# Patient Record
Sex: MALE | Race: OTHER | NOT HISPANIC OR LATINO | URBAN - METROPOLITAN AREA
[De-identification: names, ages, dates, MRNs, and addresses within clinical notes are randomized per-mention and may not be internally consistent; named-entity substitution may affect disease eponyms.]

---

## 2018-03-29 ENCOUNTER — EMERGENCY (EMERGENCY)
Facility: HOSPITAL | Age: 28
LOS: 1 days | Discharge: HOME | End: 2018-03-29
Attending: EMERGENCY MEDICINE | Admitting: EMERGENCY MEDICINE
Payer: MEDICAID

## 2018-03-29 VITALS
SYSTOLIC BLOOD PRESSURE: 131 MMHG | WEIGHT: 225.09 LBS | RESPIRATION RATE: 18 BRPM | HEART RATE: 94 BPM | DIASTOLIC BLOOD PRESSURE: 83 MMHG | OXYGEN SATURATION: 98 % | TEMPERATURE: 98 F

## 2018-03-29 LAB
ALBUMIN SERPL ELPH-MCNC: 4.6 G/DL — SIGNIFICANT CHANGE UP (ref 3.3–5)
ALP SERPL-CCNC: 67 U/L — SIGNIFICANT CHANGE UP (ref 40–120)
ALT FLD-CCNC: 25 U/L RC — SIGNIFICANT CHANGE UP (ref 10–45)
ANION GAP SERPL CALC-SCNC: 14 MMOL/L — SIGNIFICANT CHANGE UP (ref 5–17)
APPEARANCE UR: ABNORMAL
AST SERPL-CCNC: 22 U/L — SIGNIFICANT CHANGE UP (ref 10–40)
BASOPHILS # BLD AUTO: 0 K/UL — SIGNIFICANT CHANGE UP (ref 0–0.2)
BASOPHILS NFR BLD AUTO: 0.4 % — SIGNIFICANT CHANGE UP (ref 0–2)
BILIRUB SERPL-MCNC: 0.3 MG/DL — SIGNIFICANT CHANGE UP (ref 0.2–1.2)
BILIRUB UR-MCNC: NEGATIVE — SIGNIFICANT CHANGE UP
BUN SERPL-MCNC: 14 MG/DL — SIGNIFICANT CHANGE UP (ref 7–23)
CALCIUM SERPL-MCNC: 9.5 MG/DL — SIGNIFICANT CHANGE UP (ref 8.4–10.5)
CHLORIDE SERPL-SCNC: 104 MMOL/L — SIGNIFICANT CHANGE UP (ref 96–108)
CO2 SERPL-SCNC: 24 MMOL/L — SIGNIFICANT CHANGE UP (ref 22–31)
COLOR SPEC: YELLOW — SIGNIFICANT CHANGE UP
CREAT SERPL-MCNC: 0.93 MG/DL — SIGNIFICANT CHANGE UP (ref 0.5–1.3)
DIFF PNL FLD: NEGATIVE — SIGNIFICANT CHANGE UP
EOSINOPHIL # BLD AUTO: 0.2 K/UL — SIGNIFICANT CHANGE UP (ref 0–0.5)
EOSINOPHIL NFR BLD AUTO: 1.6 % — SIGNIFICANT CHANGE UP (ref 0–6)
GAS PNL BLDV: SIGNIFICANT CHANGE UP
GLUCOSE SERPL-MCNC: 99 MG/DL — SIGNIFICANT CHANGE UP (ref 70–99)
GLUCOSE UR QL: NEGATIVE — SIGNIFICANT CHANGE UP
HCT VFR BLD CALC: 47.5 % — SIGNIFICANT CHANGE UP (ref 39–50)
HGB BLD-MCNC: 17.3 G/DL — HIGH (ref 13–17)
KETONES UR-MCNC: NEGATIVE — SIGNIFICANT CHANGE UP
LEUKOCYTE ESTERASE UR-ACNC: NEGATIVE — SIGNIFICANT CHANGE UP
LYMPHOCYTES # BLD AUTO: 3.8 K/UL — HIGH (ref 1–3.3)
LYMPHOCYTES # BLD AUTO: 31.1 % — SIGNIFICANT CHANGE UP (ref 13–44)
MAGNESIUM SERPL-MCNC: 2.2 MG/DL — SIGNIFICANT CHANGE UP (ref 1.6–2.6)
MCHC RBC-ENTMCNC: 33.5 PG — SIGNIFICANT CHANGE UP (ref 27–34)
MCHC RBC-ENTMCNC: 36.3 GM/DL — HIGH (ref 32–36)
MCV RBC AUTO: 92.3 FL — SIGNIFICANT CHANGE UP (ref 80–100)
MONOCYTES # BLD AUTO: 0.8 K/UL — SIGNIFICANT CHANGE UP (ref 0–0.9)
MONOCYTES NFR BLD AUTO: 6.3 % — SIGNIFICANT CHANGE UP (ref 2–14)
NEUTROPHILS # BLD AUTO: 7.4 K/UL — SIGNIFICANT CHANGE UP (ref 1.8–7.4)
NEUTROPHILS NFR BLD AUTO: 60.6 % — SIGNIFICANT CHANGE UP (ref 43–77)
NITRITE UR-MCNC: NEGATIVE — SIGNIFICANT CHANGE UP
PH UR: 7.5 — SIGNIFICANT CHANGE UP (ref 5–8)
PHOSPHATE SERPL-MCNC: 3.7 MG/DL — SIGNIFICANT CHANGE UP (ref 2.5–4.5)
PLATELET # BLD AUTO: 210 K/UL — SIGNIFICANT CHANGE UP (ref 150–400)
POTASSIUM SERPL-MCNC: 4 MMOL/L — SIGNIFICANT CHANGE UP (ref 3.5–5.3)
POTASSIUM SERPL-SCNC: 4 MMOL/L — SIGNIFICANT CHANGE UP (ref 3.5–5.3)
PROT SERPL-MCNC: 8.2 G/DL — SIGNIFICANT CHANGE UP (ref 6–8.3)
PROT UR-MCNC: SIGNIFICANT CHANGE UP
RBC # BLD: 5.15 M/UL — SIGNIFICANT CHANGE UP (ref 4.2–5.8)
RBC # FLD: 11.8 % — SIGNIFICANT CHANGE UP (ref 10.3–14.5)
SODIUM SERPL-SCNC: 142 MMOL/L — SIGNIFICANT CHANGE UP (ref 135–145)
SP GR SPEC: 1.03 — HIGH (ref 1.01–1.02)
UROBILINOGEN FLD QL: 4 MG/DL
WBC # BLD: 12.3 K/UL — HIGH (ref 3.8–10.5)
WBC # FLD AUTO: 12.3 K/UL — HIGH (ref 3.8–10.5)

## 2018-03-29 PROCEDURE — 99284 EMERGENCY DEPT VISIT MOD MDM: CPT

## 2018-03-29 RX ORDER — ACETAMINOPHEN 500 MG
1000 TABLET ORAL ONCE
Qty: 0 | Refills: 0 | Status: COMPLETED | OUTPATIENT
Start: 2018-03-29 | End: 2018-03-29

## 2018-03-29 RX ORDER — SODIUM CHLORIDE 9 MG/ML
1000 INJECTION, SOLUTION INTRAVENOUS ONCE
Qty: 0 | Refills: 0 | Status: COMPLETED | OUTPATIENT
Start: 2018-03-29 | End: 2018-03-29

## 2018-03-29 RX ORDER — LIDOCAINE 4 G/100G
10 CREAM TOPICAL ONCE
Qty: 0 | Refills: 0 | Status: COMPLETED | OUTPATIENT
Start: 2018-03-29 | End: 2018-03-29

## 2018-03-29 RX ADMIN — Medication 400 MILLIGRAM(S): at 23:26

## 2018-03-29 RX ADMIN — SODIUM CHLORIDE 1000 MILLILITER(S): 9 INJECTION, SOLUTION INTRAVENOUS at 23:25

## 2018-03-29 RX ADMIN — LIDOCAINE 10 MILLILITER(S): 4 CREAM TOPICAL at 23:26

## 2018-03-29 NOTE — ED PROVIDER NOTE - CARE PLAN
Principal Discharge DX:	Strep throat  Assessment and plan of treatment:	- You received an antibiotic injection in the ER.  You do not require any further antibiotics.    - Please follow up with your PMD early next week.

## 2018-03-29 NOTE — ED ADULT NURSE NOTE - OBJECTIVE STATEMENT
right flank /rlq pain x 1 week; thoat pain, intermittent weakness; tmax 102 yesterday at home; went to urgent care ; sent in for eval

## 2018-03-29 NOTE — ED PROVIDER NOTE - PLAN OF CARE
- You received an antibiotic injection in the ER.  You do not require any further antibiotics.    - Please follow up with your PMD early next week.

## 2018-03-29 NOTE — ED PROVIDER NOTE - MEDICAL DECISION MAKING DETAILS
27M with RLQ/flank pain, urinary frequency, malodorous urine, throat pain, weakness, dizziness/lightheadedness, anorexia, and fevers concerning for pyelonephritis vs appendicitis vs cholecystitis.  Will obtain CBC, CMP, Mg, Phos, RVP, UA, UCx, BCx, CT A/P with PO/IV contrast.  Will start with giving Ofirmev for pain/low grade fever, viscous lidocaine for throat pain, and LR 1L. 27M with RLQ/flank pain, urinary frequency, malodorous urine, throat pain, weakness, dizziness/lightheadedness, anorexia, and fevers concerning for pyelonephritis vs appendicitis vs cholecystitis.  Will obtain CBC, CMP, Mg, Phos, VBG, RVP, UA, UCx, BCx, CT A/P with PO/IV contrast.  Will start with giving Ofirmev for pain/low grade fever, viscous lidocaine for throat pain, and LR 1L. 27M with RLQ/flank pain, urinary frequency, malodorous urine, throat pain, weakness, dizziness/lightheadedness, anorexia, and fevers concerning for pyelonephritis vs appendicitis vs cholecystitis.  Will obtain CBC, CMP, Mg, Phos, VBG, RVP, UA, UCx, BCx, CT A/P with PO/IV contrast.  Will start with giving Ofirmev for pain/low grade fever, viscous lidocaine for throat pain, and LR 1L.    YUE Rabago MD: Pt is a 28 y/o male with no significant PMH who presents with abd pain x7d.  Patient reports RLQ/flank pain that waxes and wanes. Patient had also developed throat pain.  In the last 2-3d, he has had fevers with Tmax 102 yesterday, weakness, and intermittent dizziness.  For his symptoms, he has been taking PRN Tylenol, Motrin, and Sudafed.  Endorses dry cough, anorexia, urinary frequency, malodorous urine.  Denies runny/stuffy nose, CP, myalgias, N/V/C/D, dysuria.  Patient with positive sick contact history in 1 year old son who goes to  who 2d ago had a mild URI.  DDx: URI, appy, mesenteric adenitis, strep throat  Plan: basic labs, CTA/P, strep throat cx

## 2018-03-29 NOTE — ED PROVIDER NOTE - PROGRESS NOTE DETAILS
Pt is feeling much better, aware that he has strep throat and will get penicillin here in ED.  Aware he needs pmd follow up.  - Nancy Cisneros, DO

## 2018-03-29 NOTE — ED PROVIDER NOTE - PHYSICAL EXAMINATION
GENERAL: NAD, ill-appearing, obese, well-developed  HEAD: Atraumatic, Normocephalic  EYES: EOMI, PERRLA, conjunctiva and sclera clear  MOUTH: MMM, no pharyngitis  NECK: Supple, No JVD  CHEST/LUNG: Clear to auscultation bilaterally; No wheeze  HEART: Regular rate and rhythm; No murmurs, rubs, or gallops  ABDOMEN: Soft, R mid/flank-TTP, Nondistended; Bowel sounds present  EXTREMITIES:  2+ Peripheral Pulses, No clubbing, cyanosis, or edema  PSYCH: AAOx3  SKIN: No rashes or lesions

## 2018-03-29 NOTE — ED PROVIDER NOTE - NS ED ROS FT
CONSTITUTIONAL: +weakness, fevers, No chills  EYES/ENT: +throat pain, No visual changes;  No vertigo   NECK: No pain or stiffness  RESPIRATORY: +dry cough, No wheezing, hemoptysis; No shortness of breath  CARDIOVASCULAR: No chest pain or palpitations  GASTROINTESTINAL: +RLQ/R flank pain, No nausea, vomiting, or hematemesis; No diarrhea or constipation. No melena or hematochezia.  GENITOURINARY: +urinary frequency, malodorous urine, No dysuria or hematuria  NEUROLOGICAL: No numbness or weakness  SKIN: No itching, burning, rashes, or lesions   All other review of systems is negative unless indicated above.

## 2018-03-29 NOTE — ED PROVIDER NOTE - FAMILY HISTORY
No pertinent family history in first degree relatives Mother  Still living? Unknown  Family history of hypertension, Age at diagnosis: Age Unknown  Family history of diabetes mellitus, Age at diagnosis: Age Unknown     Father  Still living? Unknown  Family history of hypertension, Age at diagnosis: Age Unknown  Family history of coronary artery disease, Age at diagnosis: Age Unknown  Family history of cerebrovascular accident (CVA) in father, Age at diagnosis: Age Unknown     Sibling  Still living? Unknown  Family history of hypertension, Age at diagnosis: Age Unknown  Family history of uterine cancer, Age at diagnosis: Age Unknown  Family history of breast cancer in sister, Age at diagnosis: Age Unknown

## 2018-03-29 NOTE — ED PROVIDER NOTE - OBJECTIVE STATEMENT
27M with no significant PMH, current smoker who presents 27M with no significant PMH, current smoker who presents with abd pain x7d.  Patient reports RLQ/flank pain that waxes and wanes.  At worst, he states it is a 9/10 and at best, 6/10.  Pain exacerbated at by deep breaths.  At onset, patient had also developed throat pain.  In the last 2-3d, he has had fevers with Tmax 102 yesterday, weakness, and intermittent dizziness.  For his symptoms, he has been taking PRN Tylenol, Motrin, and Sudafed.  Patient with positive sick contact history in 1 year old son who goes to  who 2d ago had a mild URI.    Today, patient attempted to go see his PMD, Dr Navas, but his office was closed.  As such, his fiance took him to Urgent Care, who recommended patient come for further evaluation to r/o appendicitis, etc.    PMD Dr Mark Navas 27M with no significant PMH, current smoker who presents with abd pain x7d.  Patient reports RLQ/flank pain that waxes and wanes.  At worst, he states it is a 9/10 and at best, 6/10.  Pain exacerbated at by deep breaths.  At onset, patient had also developed throat pain.  In the last 2-3d, he has had fevers with Tmax 102 yesterday, weakness, and intermittent dizziness.  For his symptoms, he has been taking PRN Tylenol, Motrin, and Sudafed.  Endorses dry cough, anorexia, urinary frequency, malodorous urine.  Denies runny/stuffy nose, CP, myalgias, N/V/C/D, dysuria.  Patient with positive sick contact history in 1 year old son who goes to  who 2d ago had a mild URI.    Today, patient attempted to go see his PMD, Dr Navas, but his office was closed.  As such, his fiance took him to Urgent Care, who recommended patient come for further evaluation to r/o appendicitis, etc.    PMD Dr Mark Navas

## 2018-03-30 VITALS
SYSTOLIC BLOOD PRESSURE: 119 MMHG | OXYGEN SATURATION: 97 % | TEMPERATURE: 98 F | HEART RATE: 92 BPM | RESPIRATION RATE: 17 BRPM | DIASTOLIC BLOOD PRESSURE: 74 MMHG

## 2018-03-30 LAB
C TRACH RRNA SPEC QL NAA+PROBE: SIGNIFICANT CHANGE UP
CULTURE RESULTS: SIGNIFICANT CHANGE UP
N GONORRHOEA RRNA SPEC QL NAA+PROBE: SIGNIFICANT CHANGE UP
RAPID RVP RESULT: SIGNIFICANT CHANGE UP
S PYO AG SPEC QL IA: POSITIVE
SPECIMEN SOURCE: SIGNIFICANT CHANGE UP
SPECIMEN SOURCE: SIGNIFICANT CHANGE UP

## 2018-03-30 PROCEDURE — 85027 COMPLETE CBC AUTOMATED: CPT

## 2018-03-30 PROCEDURE — 80053 COMPREHEN METABOLIC PANEL: CPT

## 2018-03-30 PROCEDURE — 84132 ASSAY OF SERUM POTASSIUM: CPT

## 2018-03-30 PROCEDURE — 87086 URINE CULTURE/COLONY COUNT: CPT

## 2018-03-30 PROCEDURE — 87633 RESP VIRUS 12-25 TARGETS: CPT

## 2018-03-30 PROCEDURE — 87040 BLOOD CULTURE FOR BACTERIA: CPT

## 2018-03-30 PROCEDURE — 83605 ASSAY OF LACTIC ACID: CPT

## 2018-03-30 PROCEDURE — 87486 CHLMYD PNEUM DNA AMP PROBE: CPT

## 2018-03-30 PROCEDURE — 82803 BLOOD GASES ANY COMBINATION: CPT

## 2018-03-30 PROCEDURE — 96374 THER/PROPH/DIAG INJ IV PUSH: CPT | Mod: XU

## 2018-03-30 PROCEDURE — 82435 ASSAY OF BLOOD CHLORIDE: CPT

## 2018-03-30 PROCEDURE — 87798 DETECT AGENT NOS DNA AMP: CPT

## 2018-03-30 PROCEDURE — 99284 EMERGENCY DEPT VISIT MOD MDM: CPT | Mod: 25

## 2018-03-30 PROCEDURE — 82330 ASSAY OF CALCIUM: CPT

## 2018-03-30 PROCEDURE — 74177 CT ABD & PELVIS W/CONTRAST: CPT | Mod: 26

## 2018-03-30 PROCEDURE — 96372 THER/PROPH/DIAG INJ SC/IM: CPT | Mod: XU

## 2018-03-30 PROCEDURE — 84295 ASSAY OF SERUM SODIUM: CPT

## 2018-03-30 PROCEDURE — 84100 ASSAY OF PHOSPHORUS: CPT

## 2018-03-30 PROCEDURE — 87581 M.PNEUMON DNA AMP PROBE: CPT

## 2018-03-30 PROCEDURE — 81001 URINALYSIS AUTO W/SCOPE: CPT

## 2018-03-30 PROCEDURE — 85014 HEMATOCRIT: CPT

## 2018-03-30 PROCEDURE — 83735 ASSAY OF MAGNESIUM: CPT

## 2018-03-30 PROCEDURE — 87880 STREP A ASSAY W/OPTIC: CPT

## 2018-03-30 PROCEDURE — 74177 CT ABD & PELVIS W/CONTRAST: CPT

## 2018-03-30 PROCEDURE — 82947 ASSAY GLUCOSE BLOOD QUANT: CPT

## 2018-03-30 RX ORDER — AMOXICILLIN 250 MG/5ML
500 SUSPENSION, RECONSTITUTED, ORAL (ML) ORAL ONCE
Qty: 0 | Refills: 0 | Status: COMPLETED | OUTPATIENT
Start: 2018-03-30 | End: 2018-03-30

## 2018-03-30 RX ORDER — PENICILLIN G BENZATHINE 1200000 [IU]/2ML
1.2 INJECTION, SUSPENSION INTRAMUSCULAR ONCE
Qty: 0 | Refills: 0 | Status: COMPLETED | OUTPATIENT
Start: 2018-03-30 | End: 2018-03-30

## 2018-03-30 RX ORDER — DEXAMETHASONE 0.5 MG/5ML
6 ELIXIR ORAL ONCE
Qty: 0 | Refills: 0 | Status: COMPLETED | OUTPATIENT
Start: 2018-03-30 | End: 2018-03-30

## 2018-03-30 RX ADMIN — Medication 1000 MILLIGRAM(S): at 01:38

## 2018-03-30 RX ADMIN — PENICILLIN G BENZATHINE 1.2 MILLION UNIT(S): 1200000 INJECTION, SUSPENSION INTRAMUSCULAR at 03:31

## 2018-03-30 RX ADMIN — Medication 6 MILLIGRAM(S): at 03:30

## 2018-03-30 RX ADMIN — Medication 500 MILLIGRAM(S): at 01:52

## 2018-04-04 LAB
CULTURE RESULTS: SIGNIFICANT CHANGE UP
SPECIMEN SOURCE: SIGNIFICANT CHANGE UP

## 2019-01-04 ENCOUNTER — EMERGENCY (EMERGENCY)
Facility: HOSPITAL | Age: 29
LOS: 1 days | Discharge: HOME | End: 2019-01-04
Attending: EMERGENCY MEDICINE
Payer: MEDICAID

## 2019-01-04 VITALS
SYSTOLIC BLOOD PRESSURE: 120 MMHG | TEMPERATURE: 98 F | HEART RATE: 86 BPM | RESPIRATION RATE: 18 BRPM | DIASTOLIC BLOOD PRESSURE: 77 MMHG | OXYGEN SATURATION: 97 %

## 2019-01-04 VITALS
RESPIRATION RATE: 16 BRPM | DIASTOLIC BLOOD PRESSURE: 82 MMHG | SYSTOLIC BLOOD PRESSURE: 118 MMHG | WEIGHT: 210.1 LBS | TEMPERATURE: 98 F | OXYGEN SATURATION: 98 % | HEART RATE: 78 BPM

## 2019-01-04 LAB
ALBUMIN SERPL ELPH-MCNC: 4.6 G/DL — SIGNIFICANT CHANGE UP (ref 3.3–5)
ALP SERPL-CCNC: 65 U/L — SIGNIFICANT CHANGE UP (ref 40–120)
ALT FLD-CCNC: 27 U/L — SIGNIFICANT CHANGE UP (ref 10–45)
ANION GAP SERPL CALC-SCNC: 15 MMOL/L — SIGNIFICANT CHANGE UP (ref 5–17)
AST SERPL-CCNC: 19 U/L — SIGNIFICANT CHANGE UP (ref 10–40)
BASOPHILS # BLD AUTO: 0.1 K/UL — SIGNIFICANT CHANGE UP (ref 0–0.2)
BASOPHILS NFR BLD AUTO: 1 % — SIGNIFICANT CHANGE UP (ref 0–2)
BILIRUB SERPL-MCNC: 0.3 MG/DL — SIGNIFICANT CHANGE UP (ref 0.2–1.2)
BUN SERPL-MCNC: 16 MG/DL — SIGNIFICANT CHANGE UP (ref 7–23)
CALCIUM SERPL-MCNC: 9.9 MG/DL — SIGNIFICANT CHANGE UP (ref 8.4–10.5)
CHLORIDE SERPL-SCNC: 106 MMOL/L — SIGNIFICANT CHANGE UP (ref 96–108)
CO2 SERPL-SCNC: 23 MMOL/L — SIGNIFICANT CHANGE UP (ref 22–31)
CREAT SERPL-MCNC: 1.24 MG/DL — SIGNIFICANT CHANGE UP (ref 0.5–1.3)
EOSINOPHIL # BLD AUTO: 0.3 K/UL — SIGNIFICANT CHANGE UP (ref 0–0.5)
EOSINOPHIL NFR BLD AUTO: 3.3 % — SIGNIFICANT CHANGE UP (ref 0–6)
GLUCOSE SERPL-MCNC: 91 MG/DL — SIGNIFICANT CHANGE UP (ref 70–99)
HCT VFR BLD CALC: 47.2 % — SIGNIFICANT CHANGE UP (ref 39–50)
HGB BLD-MCNC: 16.7 G/DL — SIGNIFICANT CHANGE UP (ref 13–17)
LYMPHOCYTES # BLD AUTO: 4.5 K/UL — HIGH (ref 1–3.3)
LYMPHOCYTES # BLD AUTO: 50.4 % — HIGH (ref 13–44)
MCHC RBC-ENTMCNC: 32.1 PG — SIGNIFICANT CHANGE UP (ref 27–34)
MCHC RBC-ENTMCNC: 35.3 GM/DL — SIGNIFICANT CHANGE UP (ref 32–36)
MCV RBC AUTO: 90.9 FL — SIGNIFICANT CHANGE UP (ref 80–100)
MONOCYTES # BLD AUTO: 0.7 K/UL — SIGNIFICANT CHANGE UP (ref 0–0.9)
MONOCYTES NFR BLD AUTO: 7.6 % — SIGNIFICANT CHANGE UP (ref 2–14)
NEUTROPHILS # BLD AUTO: 3.3 K/UL — SIGNIFICANT CHANGE UP (ref 1.8–7.4)
NEUTROPHILS NFR BLD AUTO: 37.7 % — LOW (ref 43–77)
PLATELET # BLD AUTO: 209 K/UL — SIGNIFICANT CHANGE UP (ref 150–400)
POTASSIUM SERPL-MCNC: 4.7 MMOL/L — SIGNIFICANT CHANGE UP (ref 3.5–5.3)
POTASSIUM SERPL-SCNC: 4.7 MMOL/L — SIGNIFICANT CHANGE UP (ref 3.5–5.3)
PROT SERPL-MCNC: 7.3 G/DL — SIGNIFICANT CHANGE UP (ref 6–8.3)
RBC # BLD: 5.19 M/UL — SIGNIFICANT CHANGE UP (ref 4.2–5.8)
RBC # FLD: 12.3 % — SIGNIFICANT CHANGE UP (ref 10.3–14.5)
SODIUM SERPL-SCNC: 144 MMOL/L — SIGNIFICANT CHANGE UP (ref 135–145)
TROPONIN T, HIGH SENSITIVITY RESULT: <6 NG/L — SIGNIFICANT CHANGE UP (ref 0–51)
WBC # BLD: 8.8 K/UL — SIGNIFICANT CHANGE UP (ref 3.8–10.5)
WBC # FLD AUTO: 8.8 K/UL — SIGNIFICANT CHANGE UP (ref 3.8–10.5)

## 2019-01-04 PROCEDURE — 80053 COMPREHEN METABOLIC PANEL: CPT

## 2019-01-04 PROCEDURE — 71046 X-RAY EXAM CHEST 2 VIEWS: CPT | Mod: 26

## 2019-01-04 PROCEDURE — 99284 EMERGENCY DEPT VISIT MOD MDM: CPT | Mod: 25

## 2019-01-04 PROCEDURE — 71046 X-RAY EXAM CHEST 2 VIEWS: CPT

## 2019-01-04 PROCEDURE — 96374 THER/PROPH/DIAG INJ IV PUSH: CPT

## 2019-01-04 PROCEDURE — 93005 ELECTROCARDIOGRAM TRACING: CPT

## 2019-01-04 PROCEDURE — 84484 ASSAY OF TROPONIN QUANT: CPT

## 2019-01-04 PROCEDURE — 85027 COMPLETE CBC AUTOMATED: CPT

## 2019-01-04 PROCEDURE — 93010 ELECTROCARDIOGRAM REPORT: CPT

## 2019-01-04 RX ORDER — KETOROLAC TROMETHAMINE 30 MG/ML
15 SYRINGE (ML) INJECTION ONCE
Qty: 0 | Refills: 0 | Status: DISCONTINUED | OUTPATIENT
Start: 2019-01-04 | End: 2019-01-04

## 2019-01-04 RX ADMIN — Medication 15 MILLIGRAM(S): at 13:49

## 2019-01-04 RX ADMIN — Medication 15 MILLIGRAM(S): at 14:20

## 2019-01-04 NOTE — ED ADULT NURSE NOTE - OBJECTIVE STATEMENT
1010 28 yr old male c/o intermittent sternal chest pain radiating into left side of neck associated with dizziness and SOB when ambulating x 2 days. Smokes 1.5 packs/day cigarettes. States his father and multiple other relatives had heart attacks in their 30's. A&Ox4. Color pink. skin W&D. Looks tachypneic. Breath sounds decreased at left base. Sternum TTP. Denies cough, congestion, fever, chills, recent injury or heavy lifting

## 2019-01-04 NOTE — ED PROVIDER NOTE - NSFOLLOWUPINSTRUCTIONS_ED_ALL_ED_FT
You were seen and evaluated in the emergency department for chest pain. Your exam, Is highly suggestive of a muscle strain. We did not find evidence for a dangerous cause of your pain. This does not mean your pain is not real or concerning, only that we could not find a dangerous or life-threatening cause. Please read the attached information sheets as they will provide useful information regarding your condition.    You may take up to 600mg of ibuprofen (Motrin, Advil) every 6 hours as needed for pain. Please take ibuprofen with food if possible to prevent stomach upset. You may also take up to 1000mg of acetaminophen (Tylenol) every 6 hours as needed for pain. It is ok to mix these medications with each other. Do not mix them with other pain medications such as naproxen (Alieve) or asprin unless specifically instructed by your doctor. Many prescription pain medications and cough medications contain acetaminophen. If you take these medications, please discuss with your provider or primary care doctor if you need to adjust the dose of acetaminophen you can take. You may apply ice to the affected area for no more than 20 minutes as needed for comfort. You may apply ice every 2-4 times a day as needed.   It is important to understand that while your workup was reassuring, no medical workup can completely exclude all concerning conditions. Therefore, we ask that you please return if you develop new or worsening pain, trouble breathing, fainting (or feel that you might faint), weakness, inability to perform your daily activities, or other concerning new symptoms (such as listed on the attached information sheets. Please read the attached information sheets. Take your medication as prescribed.    Please be sure to follow up with your regular doctor in the next 7-10 days.   Seek immediate medical care for any new/worsening signs or symptoms. You were seen and evaluated in the emergency department for chest pain. Your exam, Is highly suggestive of a muscle strain. We did not find evidence for a dangerous cause of your pain. This does not mean your pain is not real or concerning, only that we could not find a dangerous or life-threatening cause. Please read the attached information sheets as they will provide useful information regarding your condition.    You may take up to 600mg of ibuprofen (Motrin, Advil) every 6 hours as needed for pain. Please take ibuprofen with food if possible to prevent stomach upset. You may also take up to 1000mg of acetaminophen (Tylenol) every 6 hours as needed for pain. It is ok to mix these medications with each other. Do not mix them with other pain medications such as naproxen (Aleve) or asprin unless specifically instructed by your doctor. Many prescription pain medications and cough medications contain acetaminophen. If you take these medications, please discuss with your provider or primary care doctor if you need to adjust the dose of acetaminophen you can take. You may apply ice to the affected area for no more than 20 minutes as needed for comfort. You may apply ice every 2-4 times a day as needed.   It is important to understand that while your workup was reassuring, no medical workup can completely exclude all concerning conditions. Therefore, we ask that you please return if you develop new or worsening pain, trouble breathing, fainting (or feel that you might faint), weakness, inability to perform your daily activities, or other concerning new symptoms (such as listed on the attached information sheets. Please read the attached information sheets. Take your medication as prescribed.    Please be sure to follow up with your regular doctor in the next 7-10 days.   Seek immediate medical care for any new/worsening signs or symptoms.

## 2019-01-04 NOTE — ED PROVIDER NOTE - MEDICAL DECISION MAKING DETAILS
Chest pain r/o, URI sx for the last 2 days, current every day smoker, exam unremarkable, ekg without ischemic findings, will check basics, trop, cxr, reassess  Isadora Crane, PGY-2 EM [place]: Chest pain r/o, URI sx for the last 2 days, current every day smoker, exam unremarkable, ekg without ischemic findings, will check basics, trop, cxr, reassess  Isadora Crane, PGY-2 EM

## 2019-01-04 NOTE — ED PROVIDER NOTE - OBJECTIVE STATEMENT
27 yo M no chronic medical conditions p/w pleuritic chest pain. Has been going on for 2 days. Also dry cough for the last 2 days. Endorsing dizziness, headaches, and shortness of breath. Denies fever or chills. Denies recent travel or sick contacts. Took asa with minor relief. Current every day smoker.

## 2019-01-04 NOTE — ED ADULT NURSE NOTE - NSIMPLEMENTINTERV_GEN_ALL_ED
Implemented All Universal Safety Interventions:  [place] to call system. Call bell, personal items and telephone within reach. Instruct patient to call for assistance. Room bathroom lighting operational. Non-slip footwear when patient is off stretcher. Physically safe environment: no spills, clutter or unnecessary equipment. Stretcher in lowest position, wheels locked, appropriate side rails in place.

## 2019-01-04 NOTE — ED PROVIDER NOTE - NS ED ROS FT
General: No fevers / chills  HENT: No head trauma, ear pain, runny nose, or sore throat  Eyes: No visual changes  CP: +chest pain, + light handedness  Resp: + shortness of breath, +cough  GI: No abdominal pain, diarrhea, constipation, nausea, or vomiting  : No urinary fz, dysuria, or hematuria  Neuro: No numbness, tingling, or weakness